# Patient Record
Sex: MALE | Race: WHITE | Employment: PART TIME | URBAN - NONMETROPOLITAN AREA
[De-identification: names, ages, dates, MRNs, and addresses within clinical notes are randomized per-mention and may not be internally consistent; named-entity substitution may affect disease eponyms.]

---

## 2017-08-22 ENCOUNTER — OFFICE VISIT (OUTPATIENT)
Dept: FAMILY MEDICINE CLINIC | Age: 22
End: 2017-08-22

## 2017-08-22 VITALS
BODY MASS INDEX: 25.68 KG/M2 | HEIGHT: 69 IN | WEIGHT: 173.4 LBS | TEMPERATURE: 97.8 F | HEART RATE: 69 BPM | OXYGEN SATURATION: 97 %

## 2017-08-22 DIAGNOSIS — J02.9 ACUTE VIRAL PHARYNGITIS: Primary | ICD-10-CM

## 2017-08-22 DIAGNOSIS — J02.9 ACUTE VIRAL PHARYNGITIS: ICD-10-CM

## 2017-08-22 LAB — S PYO AG THROAT QL: NORMAL

## 2017-08-22 PROCEDURE — 99212 OFFICE O/P EST SF 10 MIN: CPT | Performed by: NURSE PRACTITIONER

## 2017-08-22 PROCEDURE — 87880 STREP A ASSAY W/OPTIC: CPT | Performed by: NURSE PRACTITIONER

## 2017-08-22 ASSESSMENT — ENCOUNTER SYMPTOMS
VOMITING: 0
RHINORRHEA: 0
SINUS PRESSURE: 0
NAUSEA: 0
SHORTNESS OF BREATH: 0
SORE THROAT: 1
COUGH: 0
SWOLLEN GLANDS: 1

## 2017-08-22 ASSESSMENT — PATIENT HEALTH QUESTIONNAIRE - PHQ9
SUM OF ALL RESPONSES TO PHQ QUESTIONS 1-9: 0
2. FEELING DOWN, DEPRESSED OR HOPELESS: 0
SUM OF ALL RESPONSES TO PHQ9 QUESTIONS 1 & 2: 0
1. LITTLE INTEREST OR PLEASURE IN DOING THINGS: 0

## 2017-08-24 LAB — THROAT CULTURE: NORMAL

## 2018-07-14 ENCOUNTER — APPOINTMENT (OUTPATIENT)
Dept: GENERAL RADIOLOGY | Age: 23
End: 2018-07-14
Payer: COMMERCIAL

## 2018-07-14 ENCOUNTER — HOSPITAL ENCOUNTER (EMERGENCY)
Age: 23
Discharge: HOME | End: 2018-07-14
Payer: COMMERCIAL

## 2018-07-14 VITALS
OXYGEN SATURATION: 99 % | WEIGHT: 170 LBS | RESPIRATION RATE: 16 BRPM | HEART RATE: 63 BPM | SYSTOLIC BLOOD PRESSURE: 133 MMHG | BODY MASS INDEX: 25.18 KG/M2 | TEMPERATURE: 97.7 F | DIASTOLIC BLOOD PRESSURE: 87 MMHG | HEIGHT: 69 IN

## 2018-07-14 DIAGNOSIS — S53.409A ELBOW SPRAIN, INITIAL ENCOUNTER: Primary | ICD-10-CM

## 2018-07-14 PROCEDURE — 6370000000 HC RX 637 (ALT 250 FOR IP): Performed by: NURSE PRACTITIONER

## 2018-07-14 PROCEDURE — 99283 EMERGENCY DEPT VISIT LOW MDM: CPT

## 2018-07-14 PROCEDURE — 73080 X-RAY EXAM OF ELBOW: CPT

## 2018-07-14 RX ORDER — IBUPROFEN 800 MG/1
800 TABLET ORAL EVERY 8 HOURS PRN
Qty: 20 TABLET | Refills: 0 | Status: SHIPPED | OUTPATIENT
Start: 2018-07-14

## 2018-07-14 RX ORDER — IBUPROFEN 800 MG/1
800 TABLET ORAL ONCE
Status: COMPLETED | OUTPATIENT
Start: 2018-07-14 | End: 2018-07-14

## 2018-07-14 RX ADMIN — IBUPROFEN 800 MG: 800 TABLET ORAL at 07:45

## 2018-07-14 ASSESSMENT — PAIN SCALES - GENERAL
PAINLEVEL_OUTOF10: 6
PAINLEVEL_OUTOF10: 6

## 2018-07-14 ASSESSMENT — PAIN DESCRIPTION - PAIN TYPE: TYPE: ACUTE PAIN

## 2018-07-14 ASSESSMENT — PAIN DESCRIPTION - LOCATION: LOCATION: ELBOW

## 2018-07-14 ASSESSMENT — ENCOUNTER SYMPTOMS
VOMITING: 0
NAUSEA: 0
SHORTNESS OF BREATH: 0
ABDOMINAL PAIN: 0
COUGH: 0
DIARRHEA: 0

## 2018-07-14 ASSESSMENT — PAIN DESCRIPTION - ORIENTATION: ORIENTATION: RIGHT

## 2018-07-14 NOTE — ED PROVIDER NOTES
by the emergency physician with the below findings:    X-ray of the right elbow demonstrates no acute fracture or dislocation    Interpretation per the Radiologist below, if available at the time of this note:    XR ELBOW RIGHT (MIN 3 VIEWS)    (Results Pending)         ED BEDSIDE ULTRASOUND:   Performed by ED Physician - none    LABS:  Labs Reviewed - No data to display    All other labs were within normal range or not returned as of this dictation. EMERGENCY DEPARTMENT COURSE and DIFFERENTIAL DIAGNOSIS/MDM:   Vitals:    Vitals:    07/14/18 0641   BP: 133/87   Pulse: 63   Resp: 16   Temp: 97.7 °F (36.5 °C)   TempSrc: Oral   SpO2: 99%   Weight: 170 lb (77.1 kg)   Height: 5' 9\" (1.753 m)            MDM appears nontoxic and in no distress. His vital signs are normal.  He does have a small amount of swelling and tenderness to the right elbow. No obvious or gross deformity on exam.  X-ray was unremarkable. Given his history of UCL surgery I have advised him to follow up with orthopedics in 3 days. He will be provided a sling and instructed to use minimally. He will be provided Motrin for home going. He will be discharged home in stable condition. Standard anticipatory guidance given to patient upon discharge. Have given them a specific time frame in which to follow-up and who to follow-up with. I have also advised them that they should return to the emergency department if they get worse, or not getting better or develop any new or concerning symptoms. Patient demonstrates understanding and all questions were answered.     CRITICAL CARE TIME       CONSULTS:  None    PROCEDURES:  Unless otherwise noted below, none     Procedures    FINAL IMPRESSION      1. Elbow sprain, initial encounter          DISPOSITION/PLAN   DISPOSITION Decision To Discharge 07/14/2018 07:26:31 AM      PATIENT REFERRED TO:  Jessenia Caceres MD  [address] [facility]  [phone]    In 1 day  As needed    Miles Alcocer

## 2018-07-26 ENCOUNTER — OFFICE VISIT (OUTPATIENT)
Dept: FAMILY MEDICINE CLINIC | Age: 23
End: 2018-07-26
Payer: COMMERCIAL

## 2018-07-26 VITALS
SYSTOLIC BLOOD PRESSURE: 130 MMHG | BODY MASS INDEX: 25.03 KG/M2 | DIASTOLIC BLOOD PRESSURE: 78 MMHG | HEIGHT: 69 IN | TEMPERATURE: 97.4 F | HEART RATE: 87 BPM | WEIGHT: 169 LBS | OXYGEN SATURATION: 98 % | RESPIRATION RATE: 16 BRPM

## 2018-07-26 DIAGNOSIS — B36.0 TINEA VERSICOLOR: Primary | ICD-10-CM

## 2018-07-26 PROCEDURE — 99213 OFFICE O/P EST LOW 20 MIN: CPT | Performed by: NURSE PRACTITIONER

## 2018-07-26 RX ORDER — HYDROCODONE BITARTRATE AND ACETAMINOPHEN 5; 325 MG/1; MG/1
TABLET ORAL
Refills: 0 | COMMUNITY
Start: 2018-07-15

## 2018-07-26 RX ORDER — KETOCONAZOLE 20 MG/ML
SHAMPOO TOPICAL
Qty: 120 ML | Refills: 0 | Status: SHIPPED | OUTPATIENT
Start: 2018-07-26

## 2018-07-26 ASSESSMENT — ENCOUNTER SYMPTOMS
CHEST TIGHTNESS: 0
NAUSEA: 0
NAIL CHANGES: 0
TROUBLE SWALLOWING: 0
EYE ITCHING: 0
EYE DISCHARGE: 0
DIARRHEA: 0
COUGH: 0
EYE PAIN: 0
RHINORRHEA: 0
SHORTNESS OF BREATH: 0
EYE REDNESS: 0
FACIAL SWELLING: 0
VOMITING: 0
SORE THROAT: 0

## 2019-03-08 ENCOUNTER — OFFICE VISIT (OUTPATIENT)
Dept: FAMILY MEDICINE CLINIC | Age: 24
End: 2019-03-08
Payer: COMMERCIAL

## 2019-03-08 VITALS
DIASTOLIC BLOOD PRESSURE: 72 MMHG | HEIGHT: 69 IN | HEART RATE: 95 BPM | SYSTOLIC BLOOD PRESSURE: 124 MMHG | TEMPERATURE: 98.1 F | OXYGEN SATURATION: 96 % | WEIGHT: 176.2 LBS | BODY MASS INDEX: 26.1 KG/M2

## 2019-03-08 DIAGNOSIS — G89.29 CHRONIC PAIN OF LEFT KNEE: Primary | ICD-10-CM

## 2019-03-08 DIAGNOSIS — M25.562 CHRONIC PAIN OF LEFT KNEE: Primary | ICD-10-CM

## 2019-03-08 PROCEDURE — 99213 OFFICE O/P EST LOW 20 MIN: CPT | Performed by: NURSE PRACTITIONER

## 2019-03-08 ASSESSMENT — PATIENT HEALTH QUESTIONNAIRE - PHQ9
1. LITTLE INTEREST OR PLEASURE IN DOING THINGS: 0
SUM OF ALL RESPONSES TO PHQ9 QUESTIONS 1 & 2: 0
2. FEELING DOWN, DEPRESSED OR HOPELESS: 0
SUM OF ALL RESPONSES TO PHQ QUESTIONS 1-9: 0
SUM OF ALL RESPONSES TO PHQ QUESTIONS 1-9: 0

## 2019-03-08 ASSESSMENT — ENCOUNTER SYMPTOMS
SHORTNESS OF BREATH: 0
COUGH: 0

## 2019-09-30 ENCOUNTER — OFFICE VISIT (OUTPATIENT)
Dept: FAMILY MEDICINE CLINIC | Age: 24
End: 2019-09-30
Payer: COMMERCIAL

## 2019-09-30 VITALS
SYSTOLIC BLOOD PRESSURE: 112 MMHG | WEIGHT: 175.4 LBS | DIASTOLIC BLOOD PRESSURE: 60 MMHG | BODY MASS INDEX: 25.9 KG/M2 | OXYGEN SATURATION: 98 % | HEART RATE: 68 BPM | TEMPERATURE: 98.9 F

## 2019-09-30 DIAGNOSIS — Z00.00 ENCOUNTER FOR GENERAL HEALTH EXAMINATION: Primary | ICD-10-CM

## 2019-09-30 DIAGNOSIS — Z72.0 NICOTINE ABUSE: ICD-10-CM

## 2019-09-30 DIAGNOSIS — Z00.00 ENCOUNTER FOR GENERAL HEALTH EXAMINATION: ICD-10-CM

## 2019-09-30 LAB
ANION GAP SERPL CALCULATED.3IONS-SCNC: 15 MEQ/L (ref 9–15)
BUN BLDV-MCNC: 10 MG/DL (ref 6–20)
CALCIUM SERPL-MCNC: 9.6 MG/DL (ref 8.5–9.9)
CHLORIDE BLD-SCNC: 102 MEQ/L (ref 95–107)
CHOLESTEROL, TOTAL: 145 MG/DL (ref 0–199)
CO2: 28 MEQ/L (ref 20–31)
CREAT SERPL-MCNC: 1.19 MG/DL (ref 0.7–1.2)
GFR AFRICAN AMERICAN: >60
GFR NON-AFRICAN AMERICAN: >60
GLUCOSE BLD-MCNC: 64 MG/DL (ref 70–99)
HCT VFR BLD CALC: 49.6 % (ref 42–52)
HDLC SERPL-MCNC: 54 MG/DL (ref 40–59)
HEMOGLOBIN: 16.6 G/DL (ref 14–18)
LDL CHOLESTEROL CALCULATED: 63 MG/DL (ref 0–129)
MCH RBC QN AUTO: 32.3 PG (ref 27–31.3)
MCHC RBC AUTO-ENTMCNC: 33.5 % (ref 33–37)
MCV RBC AUTO: 96.3 FL (ref 80–100)
PDW BLD-RTO: 12.1 % (ref 11.5–14.5)
PLATELET # BLD: 270 K/UL (ref 130–400)
POTASSIUM SERPL-SCNC: 4 MEQ/L (ref 3.4–4.9)
RBC # BLD: 5.15 M/UL (ref 4.7–6.1)
SODIUM BLD-SCNC: 145 MEQ/L (ref 135–144)
TRIGL SERPL-MCNC: 139 MG/DL (ref 0–150)
WBC # BLD: 9.6 K/UL (ref 4.8–10.8)

## 2019-09-30 PROCEDURE — 99395 PREV VISIT EST AGE 18-39: CPT | Performed by: INTERNAL MEDICINE

## 2019-09-30 ASSESSMENT — ENCOUNTER SYMPTOMS
VOICE CHANGE: 0
EYE PAIN: 0
TROUBLE SWALLOWING: 0
ABDOMINAL DISTENTION: 0
COUGH: 0
COLOR CHANGE: 0
NAUSEA: 0
WHEEZING: 0
SINUS PAIN: 0
FACIAL SWELLING: 0
EYE ITCHING: 0
DIARRHEA: 0
CHEST TIGHTNESS: 0
CONSTIPATION: 0
BACK PAIN: 0
SHORTNESS OF BREATH: 0
ABDOMINAL PAIN: 0
EYE DISCHARGE: 0
RECTAL PAIN: 0
RHINORRHEA: 0
APNEA: 0
SORE THROAT: 0
VOMITING: 0
BLOOD IN STOOL: 0
EYE REDNESS: 0
SINUS PRESSURE: 0
PHOTOPHOBIA: 0

## 2024-01-30 ENCOUNTER — LAB (OUTPATIENT)
Dept: LAB | Facility: LAB | Age: 29
End: 2024-01-30
Payer: COMMERCIAL

## 2024-01-30 ENCOUNTER — OFFICE VISIT (OUTPATIENT)
Dept: GASTROENTEROLOGY | Facility: CLINIC | Age: 29
End: 2024-01-30
Payer: COMMERCIAL

## 2024-01-30 VITALS
HEIGHT: 67 IN | HEART RATE: 78 BPM | WEIGHT: 180 LBS | BODY MASS INDEX: 28.25 KG/M2 | DIASTOLIC BLOOD PRESSURE: 94 MMHG | SYSTOLIC BLOOD PRESSURE: 134 MMHG

## 2024-01-30 DIAGNOSIS — K52.9 CHRONIC DIARRHEA: Primary | ICD-10-CM

## 2024-01-30 DIAGNOSIS — K52.9 CHRONIC DIARRHEA: ICD-10-CM

## 2024-01-30 DIAGNOSIS — R10.13 EPIGASTRIC ABDOMINAL PAIN: ICD-10-CM

## 2024-01-30 DIAGNOSIS — R11.2 NAUSEA AND VOMITING IN ADULT: ICD-10-CM

## 2024-01-30 LAB
CRP SERPL-MCNC: <0.1 MG/DL
ERYTHROCYTE [SEDIMENTATION RATE] IN BLOOD BY WESTERGREN METHOD: 6 MM/H (ref 0–15)
GLIADIN PEPTIDE IGA SER IA-ACNC: <1 U/ML
TTG IGA SER IA-ACNC: <1 U/ML

## 2024-01-30 PROCEDURE — 83516 IMMUNOASSAY NONANTIBODY: CPT

## 2024-01-30 PROCEDURE — 85652 RBC SED RATE AUTOMATED: CPT

## 2024-01-30 PROCEDURE — 36415 COLL VENOUS BLD VENIPUNCTURE: CPT

## 2024-01-30 PROCEDURE — 99205 OFFICE O/P NEW HI 60 MIN: CPT | Performed by: NURSE PRACTITIONER

## 2024-01-30 PROCEDURE — 86140 C-REACTIVE PROTEIN: CPT

## 2024-01-30 RX ORDER — OMEPRAZOLE 40 MG/1
40 CAPSULE, DELAYED RELEASE ORAL DAILY
Qty: 30 CAPSULE | Refills: 2 | Status: SHIPPED | OUTPATIENT
Start: 2024-01-30 | End: 2024-04-29

## 2024-01-30 RX ORDER — ONDANSETRON 4 MG/1
4 TABLET, ORALLY DISINTEGRATING ORAL EVERY 6 HOURS PRN
COMMUNITY
Start: 2020-08-02

## 2024-01-30 RX ORDER — SUCRALFATE 1 G/1
1 TABLET ORAL
Qty: 120 TABLET | Refills: 0 | Status: SHIPPED | OUTPATIENT
Start: 2024-01-30 | End: 2024-02-29

## 2024-01-30 RX ORDER — OMEPRAZOLE 20 MG/1
20 TABLET, DELAYED RELEASE ORAL
COMMUNITY
End: 2024-01-31 | Stop reason: DRUGHIGH

## 2024-01-30 NOTE — PROGRESS NOTES
Assessment/Plan   Patient states GI symptoms started when he was around 21 years old.  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  Patient recalls having stomach pains associated with constipation and rectal bleeding during this time.  [facility] through EMR or [employer] everywhere.     Moving forward, currently patient complains of lower abdominal pain, and diarrhea alternating with constipation.  Patient reports having bowel frequency reporting 4 bowel movements a day before lunch and then 2 soft loose brown bowel movements after lunch, noting occasional blood with loose bowel movements.  He reports constipation 1 time a week,  he strains to have BM or is unable to pass stool.  Also with nausea, heartburn and acid reflux every morning. Reports decreased appetite but no early satiety and no weight loss.  Patient has tried the FODMAPs diet for about 6 months with little improvement in symptoms.  He started taking omeprazole 20 mg p.o. once daily over the last 3-4 weeks with minimal improvement with upper GI symptoms.  Is no family history of IBD or CRC.    1.  Lower abdominal pain, intermittent   2.  Acid reflux  3.  Diarrhea alternating with constipation  4.  Rectal bleeding    Sign medical release forms to obtain medical records [facility] the [facility].   Recommend colonoscopy with random biopsies to be done at the endoscopy center in [place]  Bowel prep per the endoscopist preference  Labs reviewed  Patient informed he needs a  the day of the procedure  Discussed antireflux lifestyle modifications  Avoid NSAIDs-aspirin, Excedrin, Advil, ibuprofen, Motrin, Aleve, Naprosyn, naproxen  Increase omeprazole to 40 mg p.o. once daily  Add sucralfate 1 g p.o. 4 times daily before meals and at at bedtime  Check CRP, sed rate, celiac panel  Check fecal calprotectinH. Pylori  treat, ova and parasitement, stool pathogen PCR  Increase dietary fiber or take fiber supplement once daily  Take MiraLAX 1-2 doses daily as needed for constipation  Self-care for hemorrhoids  Follow-up with GI 2 weeks after colonoscopy to review results    Antireflux lifestyle modifications   Avoid alcoholic beverages, Caffeine, carbonated beverages, smoking, trigger foods. Common trigger foods include citrus fruits, chocolate, fatty and fried foods, garlic and onions, mint flavorings, spicy foods and tomato-based foods like spaghetti sauce, salsa, chili or pizza.  Avoid aspirin and NSAIDs - Excedrin, Advil, ibuprofen, Motrin, Aleve, Naprosyn, naproxen  Eat small frequent meals. Eat at least 2 hours prior to going to bed.   Try raising the head of the bed to prevent stomach acid from coming back up.   Recommend stress reduction, weight reduction, and physical exercise can help reduce symptoms of GERD.   Call or return to office if GERD symptoms become worse or if you have difficulty swallowing, pain with swallowing, are vomiting blood, notice black or bloody stools, or have unexplained weight loss.    INTERNAL HEMORRHOIDS self care    1. Sitz bath's with lukewarm water for 15-20 minutes 1-2 times daily.    2. Then apply Tucks pads or witch hazel to anal area for 2 minutes after sitz bath and after BMs.   3.  Apply Preparation H ointment or RectiCare cream to rectum daily and reapply as needed    JATINDER Santos-CNP      Subjective     History of Present Illness:   Hua Arechiga is a 28 y.o. male who presents to GI clinic with a constellation of GI complaints.     Patient states GI symptoms started when he was around 21 years old.  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  Patient recalls having stomach pains associated with constipation and rectal bleeding during this time.      Moving forward,  currently patient complains of lower abdominal pain, and diarrhea alternating with constipation.  Patient reports having bowel frequency reporting 4 bowel movements a day before lunch and then 2 soft loose brown bowel movements after lunch, noting occasional blood with loose bowel movements.  He reports constipation 1 time a week,  he strains to have BM or is unable to pass stool.  Also with nausea, heartburn and acid reflux every morning. Reports decreased appetite but no early satiety and no weight loss.  Patient has tried the FODMAPs diet for about 6 months with little improvement in symptoms.  He started taking omeprazole 20 mg p.o. once daily over the last 3-4 weeks with minimal improvement with upper GI symptoms.  Is no family history of IBD or CRC.    [facility], records are unavailable through EMR or "[employer] everywhere.  He did bring a few copies of some medical records he had from the [facility].  Those records are scanned into the EMR.    Note: patient was seen by GI when he was 11 years old for abdominal pain nausea vomiting.  Joan underwent EGD and was found to have H. pylori gastritis.  Patient was treated with antibiotics, PPI and H. pylori was confirmed eradicated.  States he does not recall any problems after this until he joined the service and his early 20s.    Review of Systems  ROS Negative unless otherwise stated above.    Past Medical History  No chronic medical conditions    Social History   reports that he has never smoked. He does not have any smokeless tobacco history on file.  Denies smoking cigarettes, drinking alcohol, using marijuana or any illicit drugs.    Family History  No family history of CRC or IBD.    Allergies  No Known Allergies    Medications  Current Outpatient Medications   Medication Instructions    omeprazole (PRILOSEC) 40 mg, oral, Daily, Do not crush or chew.    omeprazole OTC (PRILOSEC OTC) 20 mg, oral, Daily before breakfast, Do not crush, chew,  or split.    ondansetron ODT (ZOFRAN-ODT) 4 mg, oral, Every 6 hours PRN    sucralfate (CARAFATE) 1 g, oral, 4 times daily before meals and nightly, Take 1 hour before meals and at bedtime        Objective   Visit Vitals  BP (!) 134/94   Pulse 78      General: A&Ox3, NAD.  HEENT: AT/NC.   CV: RRR. No murmur.  Resp: CTA bilaterally. No wheezing, rhonchi or rales.   GI: Soft, NT/ND. BSx4.  Extrem: No edema. Pulses intact.  Skin: No Jaundice.   Neuro: No focal deficits.   Psych: Normal mood and affect.

## 2024-02-01 LAB
GLIADIN PEPTIDE IGG SER IA-ACNC: <0.56 FLU (ref 0–4.99)
TTG IGG SER IA-ACNC: <0.82 FLU (ref 0–4.99)

## 2024-02-03 ENCOUNTER — ANESTHESIA EVENT (OUTPATIENT)
Dept: GASTROENTEROLOGY | Facility: EXTERNAL LOCATION | Age: 29
End: 2024-02-03

## 2024-02-15 ENCOUNTER — ANESTHESIA (OUTPATIENT)
Dept: GASTROENTEROLOGY | Facility: EXTERNAL LOCATION | Age: 29
End: 2024-02-15

## 2024-02-15 ENCOUNTER — HOSPITAL ENCOUNTER (OUTPATIENT)
Dept: GASTROENTEROLOGY | Facility: EXTERNAL LOCATION | Age: 29
Discharge: HOME | End: 2024-02-15
Payer: COMMERCIAL

## 2024-02-15 VITALS
HEART RATE: 75 BPM | SYSTOLIC BLOOD PRESSURE: 121 MMHG | RESPIRATION RATE: 16 BRPM | DIASTOLIC BLOOD PRESSURE: 73 MMHG | OXYGEN SATURATION: 98 % | HEIGHT: 67 IN | TEMPERATURE: 97.2 F | BODY MASS INDEX: 27 KG/M2 | WEIGHT: 172 LBS

## 2024-02-15 DIAGNOSIS — K52.9 CHRONIC DIARRHEA: ICD-10-CM

## 2024-02-15 PROCEDURE — 88305 TISSUE EXAM BY PATHOLOGIST: CPT

## 2024-02-15 PROCEDURE — 45385 COLONOSCOPY W/LESION REMOVAL: CPT | Performed by: STUDENT IN AN ORGANIZED HEALTH CARE EDUCATION/TRAINING PROGRAM

## 2024-02-15 PROCEDURE — 45380 COLONOSCOPY AND BIOPSY: CPT | Performed by: STUDENT IN AN ORGANIZED HEALTH CARE EDUCATION/TRAINING PROGRAM

## 2024-02-15 RX ORDER — SODIUM CHLORIDE 9 MG/ML
20 INJECTION, SOLUTION INTRAVENOUS CONTINUOUS
Status: DISCONTINUED | OUTPATIENT
Start: 2024-02-15 | End: 2024-02-16 | Stop reason: HOSPADM

## 2024-02-15 RX ORDER — PROPOFOL 10 MG/ML
INJECTION, EMULSION INTRAVENOUS AS NEEDED
Status: DISCONTINUED | OUTPATIENT
Start: 2024-02-15 | End: 2024-02-15

## 2024-02-15 RX ADMIN — PROPOFOL 50 MG: 10 INJECTION, EMULSION INTRAVENOUS at 10:11

## 2024-02-15 RX ADMIN — SODIUM CHLORIDE: 9 INJECTION, SOLUTION INTRAVENOUS at 10:00

## 2024-02-15 RX ADMIN — PROPOFOL 50 MG: 10 INJECTION, EMULSION INTRAVENOUS at 10:13

## 2024-02-15 RX ADMIN — PROPOFOL 50 MG: 10 INJECTION, EMULSION INTRAVENOUS at 10:05

## 2024-02-15 RX ADMIN — PROPOFOL 50 MG: 10 INJECTION, EMULSION INTRAVENOUS at 10:06

## 2024-02-15 RX ADMIN — PROPOFOL 50 MG: 10 INJECTION, EMULSION INTRAVENOUS at 10:18

## 2024-02-15 RX ADMIN — PROPOFOL 50 MG: 10 INJECTION, EMULSION INTRAVENOUS at 10:08

## 2024-02-15 RX ADMIN — PROPOFOL 50 MG: 10 INJECTION, EMULSION INTRAVENOUS at 10:15

## 2024-02-15 RX ADMIN — PROPOFOL 50 MG: 10 INJECTION, EMULSION INTRAVENOUS at 10:01

## 2024-02-15 SDOH — HEALTH STABILITY: MENTAL HEALTH: CURRENT SMOKER: 0

## 2024-02-15 ASSESSMENT — COLUMBIA-SUICIDE SEVERITY RATING SCALE - C-SSRS
2. HAVE YOU ACTUALLY HAD ANY THOUGHTS OF KILLING YOURSELF?: NO
6. HAVE YOU EVER DONE ANYTHING, STARTED TO DO ANYTHING, OR PREPARED TO DO ANYTHING TO END YOUR LIFE?: NO
1. IN THE PAST MONTH, HAVE YOU WISHED YOU WERE DEAD OR WISHED YOU COULD GO TO SLEEP AND NOT WAKE UP?: NO

## 2024-02-15 ASSESSMENT — PAIN SCALES - GENERAL
PAINLEVEL_OUTOF10: 0 - NO PAIN

## 2024-02-15 ASSESSMENT — PAIN - FUNCTIONAL ASSESSMENT
PAIN_FUNCTIONAL_ASSESSMENT: 0-10
PAIN_FUNCTIONAL_ASSESSMENT: 0-10

## 2024-02-15 NOTE — ANESTHESIA PREPROCEDURE EVALUATION
Patient: Hua Arechiga    Procedure Information       Date/Time: 02/15/24 0900    Scheduled providers: Bruce Jackson DO    Procedure: COLONOSCOPY    Location: [facility]            Relevant Problems   No relevant active problems       Clinical information reviewed:   Tobacco  Allergies  Meds   Med Hx  Surg Hx   Fam Hx  Soc Hx        NPO Detail:  NPO/Void Status  Date of Last Liquid: 02/15/24  Time of Last Liquid: 0500  Date of Last Solid: 02/13/24  Time of Last Solid: 1800  Last Intake Type: Clear fluids         Physical Exam    Airway  Mallampati: II  TM distance: >3 FB  Neck ROM: full     Cardiovascular - normal exam     Dental - normal exam     Pulmonary - normal exam     Abdominal - normal exam             Anesthesia Plan    History of general anesthesia?: no  History of complications of general anesthesia?: no    ASA 1     MAC     The patient is not a current smoker.  Patient was previously instructed to abstain from smoking on day of procedure.  Patient did not smoke on day of procedure.    intravenous induction   Anesthetic plan and risks discussed with patient.

## 2024-02-15 NOTE — H&P
Outpatient Hospital Procedure H&P    Patient Profile  Name Hua Arechiga  Date of Birth 1995  MRN [id number]  PCP         Diagnosis: Abdominal pain, diarrhea, rectal bleeding.  Procedure(s):  Colonoscopy.    Allergies  No Known Allergies    Past Medical History   History reviewed. No pertinent past medical history.    Medication Reviewed - yes  Prior to Admission medications    Medication Sig Start Date End Date Taking? Authorizing Provider   omeprazole (PriLOSEC) 40 mg DR capsule Take 1 capsule (40 mg) by mouth once daily. Do not crush or chew. 1/30/24 4/29/24 Yes IGGY Santos   ondansetron ODT (Zofran-ODT) 4 mg disintegrating tablet Take 1 tablet (4 mg) by mouth every 6 hours if needed. 8/2/20  Yes Historical Provider, MD   sucralfate (Carafate) 1 gram tablet Take 1 tablet (1 g) by mouth 4 times a day before meals. Take 1 hour before meals and at bedtime 1/30/24 2/29/24 Yes IGGY Santos       Physical Exam  Vitals:    02/15/24 0850   BP: 130/86   Pulse: 71   Resp: 22   Temp: 36.9 °C (98.4 °F)   SpO2: 98%      Weight   Vitals:    02/15/24 0850   Weight: 78 kg (172 lb)     BMI Body mass index is 26.94 kg/m².    General: A&Ox3, NAD.  HEENT: AT/NC.   CV: RRR. No murmur.  Resp: CTA bilaterally. No wheezing, rhonchi or rales.   GI: Soft, NT/ND. BSx4.  Extrem: No edema. Pulses intact.  Skin: No Jaundice.   Neuro: No focal deficits.   Psych: Normal mood and affect.        Oropharyngeal Classification II (hard and soft palate, upper portion of tonsils anduvula visible)  ASA PS Classification 1.  Sedation Plan: Deep Sedation.  Procedure Plan - pre-procedural (re)assesment completed by physician:  discharge/transfer patient when discharge criteria met    Bruce Jackson DO  2/15/2024 9:59 AM

## 2024-02-15 NOTE — ANESTHESIA POSTPROCEDURE EVALUATION
Patient: Hua Arechiga    Procedure Summary       Date: 02/15/24 Room / Location: [facility]    Anesthesia Start: 1001 Anesthesia Stop: 1026    Procedure: COLONOSCOPY Diagnosis: Chronic diarrhea    Scheduled Providers: Bruce Jackson DO Responsible Provider: KARAN Chopra    Anesthesia Type: MAC ASA Status: 1            Anesthesia Type: MAC    Vitals Value Taken Time   /73 02/15/24 1045   Temp 36.2 °C (97.2 °F) 02/15/24 1025   Pulse 75 02/15/24 1045   Resp 16 02/15/24 1045   SpO2 98 % 02/15/24 1045       Anesthesia Post Evaluation    Patient location during evaluation: PACU  Patient participation: complete - patient cannot participate  Level of consciousness: awake  Pain management: adequate  Airway patency: patent  Cardiovascular status: acceptable  Respiratory status: acceptable  Hydration status: acceptable  Postoperative Nausea and Vomiting: none        No notable events documented.

## 2024-02-15 NOTE — Clinical Note
Patient tolerated the procedure well and is comfortable with no complaints of pain. Vital signs stable. Arousable prior to transport. Patient transported to PACU via stretcher. Handoff completed. Abdomen soft

## 2024-02-15 NOTE — DISCHARGE INSTRUCTIONS
Patient Instructions Post Procedure      The anesthetics, sedatives or narcotics which were given to you today will be acting in your body for the next 24 hours, so you might feel a little sleepy or groggy.  This feeling should slowly wear off. Carefully read and follow the instructions.     You received sedation today:  - Do not drive or operate any machinery or power tools of any kind.   - No alcoholic beverages today, not even beer or wine.  - Do not make any important decisions or sign any legal documents.  - No over the counter medications that contain alcohol or that may cause drowsiness.    While it is common to experience mild to moderate abdominal distention, gas, or belching after your procedure, if any of these symptoms occur following discharge from the GI Lab or within one week of having your procedure, call the [facility] to be advised whether a visit to your nearest Urgent Care or Emergency Department is indicated.  Take this paper with you if you go.   - If you develop an allergic reaction to the medications that were given during your procedure such as difficulty breathing, rash, hives, severe nausea, vomiting or lightheadedness.  - If you experience chest pain, shortness of breath, severe abdominal pain, fevers and chills.  -If you develop signs and symptoms of bleeding such as blood in your spit, if your stools turn black, tarry, or bloody  - If you have not urinated within 8 hours following your procedure.  - If your IV site becomes painful, red, inflamed, or looks infected.    If you received a biopsy/polypectomy/sphincterotomy the following instructions apply below:  __ Do not use Aspirin containing products, non-steroidal medications or anti-coagulants for one week following your procedure. (Examples of these types of medications are: Advil, Arthrotec, Aleve, Coumadin, Ecotrin, Heparin, Ibuprofen, Indocin, Motrin, Naprosyn, Nuprin, Plavix, Vioxx, and Voltarin, or their generic  forms.  This list is not all-inclusive.  Check with your physician or pharmacist before resuming medications.)   __ Eat a soft diet today.  Avoid foods that are poorly digested for the next 24 hours.  These foods would include: nuts, beans, lettuce, red meats, and fried foods. Start with liquids and advance your diet as tolerated, gradually work up to eating solids.   __ Do not have a Barium Study or Enema for one week.    Your physician recommends the additional following instructions:    -You have a contact number available for emergencies. The signs and symptoms of potential delayed complications were discussed with you. You may return to normal activities tomorrow.  -Resume your previous diet or other if specified.  -Continue your present medications.   -We are waiting for your pathology results, if applicable.  -The findings and recommendations have been discussed with you and/or family.  - Please see Medication Reconciliation Form for new medication/medications prescribed.     If you experience any problems or have any questions following discharge from the GI Lab, please call: [phone] from 7 am- 4:30 pm.  In the event of an emergency please go to the closest Emergency Department or call Dr. Jackson [phone]

## 2024-02-20 LAB
LABORATORY COMMENT REPORT: NORMAL
PATH REPORT.FINAL DX SPEC: NORMAL
PATH REPORT.GROSS SPEC: NORMAL
PATH REPORT.TOTAL CANCER: NORMAL

## 2024-03-06 ENCOUNTER — OFFICE VISIT (OUTPATIENT)
Dept: GASTROENTEROLOGY | Facility: CLINIC | Age: 29
End: 2024-03-06
Payer: COMMERCIAL

## 2024-03-06 DIAGNOSIS — R19.7 DIARRHEA, UNSPECIFIED TYPE: ICD-10-CM

## 2024-03-06 DIAGNOSIS — K21.9 GASTROESOPHAGEAL REFLUX DISEASE WITHOUT ESOPHAGITIS: Primary | ICD-10-CM

## 2024-03-06 DIAGNOSIS — R10.30 LOWER ABDOMINAL PAIN: ICD-10-CM

## 2024-03-06 PROCEDURE — 99213 OFFICE O/P EST LOW 20 MIN: CPT | Performed by: NURSE PRACTITIONER

## 2024-03-06 NOTE — PROGRESS NOTES
Assessment/Plan   Hua Arechiga is a 28 y.o. male who is being evaluated today through a virtual visit for follow-up colonoscopy and pathology results and to evaluate response to treatment for GERD and lower abdominal pain with diarrhea alternating with constipation and rectal bleeding.  Underwent colonoscopy with random biopsies on 02/15/2024 which showed a normal terminal ileum, normal mucosa throughout, 2 subcentimeter polyps of the sigmoid colon and internal hemorrhoids.  Random biopsies were taken which showed normal colonic mucosa, negative for microscopic colitis.  As colonoscopy, patient reports bowel movements have returned to normal.  No longer having diarrhea.  He is also changed his diet over the lint season and is avoiding dairy and meat.  Eating mostly plant-based foods and fish.  Patient also had reported nausea, heartburn and reflux every morning.  He had decreased appetite as well.  Antireflux lifestyle was discussed.  Patient was asked to increase omeprazole dose to 40 mg p.o. once daily and Carafate 1 g p.o. 4 times daily before meals and at bedtime was started.  Patient reports significant improvement with upper GI symptoms.  He no longer has nausea or heartburn or reflux symptoms.  He does have abdominal bloating off-and-on.  He stopped Carafate after 4 weeks.  Patient does report having the sensation that air is trapped in his esophagus, making it difficult to belch and reports this is epigastric discomfort.  Otherwise symptoms have significantly improved.    1.  Lower abdominal pain resolved  2.  Acid reflux - significant improvement with PPI 40 daily  3.  Diarrhea alternating with constipation - resolved after colonoscopy and diet changes  4.  Rectal bleeding - likely from hemorrhoids. Resolved.      Continue omeprazole 40 mg p.o. once daily x 8 weeks.  If nominal discomfort described as air trapped in the esophagus does not improve within the next 4 weeks, will consider EGD.  Discussed  antireflux lifestyle modifications  Avoid NSAIDs-aspirin, Excedrin, Advil, ibuprofen, Motrin, Aleve, Naprosyn, naproxen  Sed rate, CRP, celiac serology were negative.  Patient did not submit stool sample for H. pylori, fecal calprotectin, stool pathogen PCR, or O&P.  Continue high fiber diet or take dietary fiber supplement once daily  Take MiraLAX 1-2 doses daily as needed for constipation  Follow-up with GI 4 weeks if symptoms do not improve.      LOV 01/30/24  Hua Arechiga is a 28 y.o. male who presents to GI clinic with a constellation of GI complaints.   patient states GI symptoms started when he was around 21 years old.  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  Patient recalls having stomach pains associated with constipation and rectal bleeding during this time.  [facility] are unavailable through EMR or [employer] everywhere.     Moving forward, currently patient complains of lower abdominal pain, and diarrhea alternating with constipation.  Patient reports having bowel frequency reporting 4 bowel movements a day before lunch and then 2 soft loose brown bowel movements after lunch, noting occasional blood with loose bowel movements.  He reports constipation 1 time a week,  he strains to have BM or is unable to pass stool.  Also with nausea, heartburn and acid reflux every morning. Reports decreased appetite but no early satiety and no weight loss.  Patient has tried the FODMAPs diet for about 6 months with little improvement in symptoms.  He started taking omeprazole 20 mg p.o. once daily over the last 3-4 weeks with minimal improvement with upper GI symptoms.  Is no family history of IBD or CRC.        Fallon Erickson, APRN-CNP      Subjective     History of Present Illness:   Hua Arechiga is a 28 y.o. male who is being evaluated today through a virtual visit for  follow-up colonoscopy and pathology results and to evaluate response to treatment for GERD and lower abdominal pain with diarrhea alternating with constipation and rectal bleeding.  Underwent colonoscopy with random biopsies on 02/15/2024 which showed a normal terminal ileum, normal mucosa throughout, 2 subcentimeter polyps of the sigmoid colon and internal hemorrhoids.  Random biopsies were taken which showed normal colonic mucosa, negative for microscopic colitis.  As colonoscopy, patient reports bowel movements have returned to normal.  No longer having diarrhea.  He is also changed his diet over the lint season and is avoiding dairy and meat.  Eating mostly plant-based foods and fish.  Patient also had reported nausea, heartburn and reflux every morning.  He had decreased appetite as well.  Antireflux lifestyle was discussed.  Patient was asked to increase omeprazole dose to 40 mg p.o. once daily and Carafate 1 g p.o. 4 times daily before meals and at bedtime was started.  Patient reports significant improvement with upper GI symptoms.  He no longer has nausea or heartburn or reflux symptoms.  He does have abdominal bloating off-and-on.  He stopped Carafate after 4 weeks.  Patient does report having the sensation that air is trapped in his esophagus, making it difficult to belch and reports this is epigastric discomfort.  Otherwise symptoms have significantly improved.    Colonoscopy 02/15/2024 indicated for chronic diarrhea  · The terminal ileum appeared normal.  · Normal mucosa throughout the colon s/p biopsies.   · 2 subcentimeter polyps in the sigmoid colon were removed with cold snare  · Hemorrhoids.  -Random colon biopsies showed colonic mucosa with no significant pathologic findings.  No evidence of microscopic colitis.  -Sigmoid colon polyps x 2 fragments of hyperplastic polyp.     Review of Systems  ROS Negative unless otherwise stated above.    Past Medical History  Family history of GI  malignancies    Social History   reports that he has never smoked. He does not have any smokeless tobacco history on file. He reports current alcohol use. He reports current drug use. Frequency: 3.00 times per week. Drug: Marijuana.     Family History  family history is not on file.     Allergies  No Known Allergies    Medications  Current Outpatient Medications   Medication Instructions    omeprazole (PRILOSEC) 40 mg, oral, Daily, Do not crush or chew.    ondansetron ODT (ZOFRAN-ODT) 4 mg, oral, Every 6 hours PRN        Objective   VV    General: A&Ox3, NAD.  Engaged in conversation  HEENT: AT/NC.   Skin: No Jaundice.   Neuro: No focal deficits.   Psych: Normal mood and affect.